# Patient Record
Sex: MALE | Race: WHITE | NOT HISPANIC OR LATINO | Employment: FULL TIME | URBAN - NONMETROPOLITAN AREA
[De-identification: names, ages, dates, MRNs, and addresses within clinical notes are randomized per-mention and may not be internally consistent; named-entity substitution may affect disease eponyms.]

---

## 2024-03-06 PROBLEM — Z79.01 ANTICOAGULATED: Status: ACTIVE | Noted: 2024-03-06

## 2024-03-06 PROBLEM — E78.2 MIXED HYPERLIPIDEMIA: Status: ACTIVE | Noted: 2024-03-06

## 2024-03-06 PROBLEM — Z86.16 PERSONAL HISTORY OF COVID-19: Status: ACTIVE | Noted: 2024-03-06

## 2024-03-06 PROBLEM — I27.82 CHRONIC PULMONARY EMBOLISM WITHOUT ACUTE COR PULMONALE (MULTI): Status: ACTIVE | Noted: 2024-03-06

## 2024-03-06 PROBLEM — R06.02 SOB (SHORTNESS OF BREATH) ON EXERTION: Status: ACTIVE | Noted: 2024-03-06

## 2024-03-06 PROBLEM — E11.9 DIABETES (MULTI): Status: ACTIVE | Noted: 2024-03-06

## 2024-03-06 PROBLEM — I10 HYPERTENSION, ESSENTIAL, BENIGN: Status: ACTIVE | Noted: 2024-03-06

## 2024-03-06 PROBLEM — R93.1 ECHOCARDIOGRAM ABNORMAL: Status: ACTIVE | Noted: 2024-03-06

## 2024-03-06 PROBLEM — R29.898 WEAKNESS OF LEFT ARM: Status: ACTIVE | Noted: 2024-03-06

## 2024-03-06 PROBLEM — I48.0 PAROXYSMAL ATRIAL FIBRILLATION (MULTI): Status: ACTIVE | Noted: 2024-03-06

## 2024-03-06 RX ORDER — METOPROLOL TARTRATE 25 MG/1
0.5 TABLET, FILM COATED ORAL 2 TIMES DAILY
COMMUNITY

## 2024-03-06 RX ORDER — AMLODIPINE BESYLATE 5 MG/1
1 TABLET ORAL DAILY
COMMUNITY
Start: 2022-08-01

## 2024-03-06 RX ORDER — PAROXETINE HYDROCHLORIDE HEMIHYDRATE 25 MG/1
1 TABLET, FILM COATED, EXTENDED RELEASE ORAL DAILY
COMMUNITY

## 2024-03-06 RX ORDER — LOSARTAN POTASSIUM 50 MG/1
2 TABLET ORAL DAILY
COMMUNITY

## 2024-03-06 RX ORDER — ROSUVASTATIN CALCIUM 10 MG/1
1 TABLET, COATED ORAL DAILY
COMMUNITY

## 2024-03-06 RX ORDER — TRIAMTERENE/HYDROCHLOROTHIAZID 37.5-25 MG
1 TABLET ORAL DAILY
COMMUNITY

## 2024-03-06 RX ORDER — INSULIN LISPRO 100 [IU]/ML
INJECTION, SOLUTION INTRAVENOUS; SUBCUTANEOUS
COMMUNITY

## 2024-03-06 RX ORDER — METFORMIN HYDROCHLORIDE 500 MG/1
1 TABLET ORAL DAILY
COMMUNITY

## 2024-03-06 RX ORDER — INSULIN DETEMIR 100 [IU]/ML
INJECTION, SOLUTION SUBCUTANEOUS
COMMUNITY

## 2024-07-10 ENCOUNTER — APPOINTMENT (OUTPATIENT)
Dept: CARDIOLOGY | Facility: CLINIC | Age: 59
End: 2024-07-10

## 2024-09-17 ENCOUNTER — APPOINTMENT (OUTPATIENT)
Dept: CARDIOLOGY | Facility: CLINIC | Age: 59
End: 2024-09-17
Payer: COMMERCIAL

## 2024-09-25 ENCOUNTER — APPOINTMENT (OUTPATIENT)
Dept: CARDIOLOGY | Facility: CLINIC | Age: 59
End: 2024-09-25
Payer: MEDICARE

## 2024-09-25 VITALS
OXYGEN SATURATION: 97 % | SYSTOLIC BLOOD PRESSURE: 118 MMHG | HEART RATE: 74 BPM | WEIGHT: 315 LBS | DIASTOLIC BLOOD PRESSURE: 66 MMHG | HEIGHT: 68 IN | BODY MASS INDEX: 47.74 KG/M2

## 2024-09-25 DIAGNOSIS — R06.02 SHORTNESS OF BREATH: ICD-10-CM

## 2024-09-25 DIAGNOSIS — E11.9 TYPE 2 DIABETES MELLITUS WITHOUT COMPLICATION, WITH LONG-TERM CURRENT USE OF INSULIN (MULTI): ICD-10-CM

## 2024-09-25 DIAGNOSIS — I10 HYPERTENSION, ESSENTIAL, BENIGN: ICD-10-CM

## 2024-09-25 DIAGNOSIS — R06.02 SOB (SHORTNESS OF BREATH) ON EXERTION: ICD-10-CM

## 2024-09-25 DIAGNOSIS — Z79.01 ANTICOAGULATED: ICD-10-CM

## 2024-09-25 DIAGNOSIS — I48.0 PAROXYSMAL ATRIAL FIBRILLATION (MULTI): ICD-10-CM

## 2024-09-25 DIAGNOSIS — Z79.4 TYPE 2 DIABETES MELLITUS WITHOUT COMPLICATION, WITH LONG-TERM CURRENT USE OF INSULIN (MULTI): ICD-10-CM

## 2024-09-25 DIAGNOSIS — E78.2 MIXED HYPERLIPIDEMIA: ICD-10-CM

## 2024-09-25 DIAGNOSIS — I27.82 CHRONIC PULMONARY EMBOLISM WITHOUT ACUTE COR PULMONALE, UNSPECIFIED PULMONARY EMBOLISM TYPE (MULTI): ICD-10-CM

## 2024-09-25 DIAGNOSIS — I82.4Y9 DEEP VEIN THROMBOSIS (DVT) OF PROXIMAL LOWER EXTREMITY, UNSPECIFIED CHRONICITY, UNSPECIFIED LATERALITY (MULTI): ICD-10-CM

## 2024-09-25 DIAGNOSIS — F17.200 CURRENT SMOKER: ICD-10-CM

## 2024-09-25 PROBLEM — Z87.891 FORMER SMOKER: Status: ACTIVE | Noted: 2024-09-25

## 2024-09-25 PROBLEM — I82.409 DVT (DEEP VENOUS THROMBOSIS) (MULTI): Status: ACTIVE | Noted: 2024-09-25

## 2024-09-25 PROCEDURE — 4010F ACE/ARB THERAPY RXD/TAKEN: CPT | Performed by: INTERNAL MEDICINE

## 2024-09-25 PROCEDURE — 99214 OFFICE O/P EST MOD 30 MIN: CPT | Performed by: INTERNAL MEDICINE

## 2024-09-25 PROCEDURE — 3078F DIAST BP <80 MM HG: CPT | Performed by: INTERNAL MEDICINE

## 2024-09-25 PROCEDURE — 4004F PT TOBACCO SCREEN RCVD TLK: CPT | Performed by: INTERNAL MEDICINE

## 2024-09-25 PROCEDURE — 3008F BODY MASS INDEX DOCD: CPT | Performed by: INTERNAL MEDICINE

## 2024-09-25 PROCEDURE — 3074F SYST BP LT 130 MM HG: CPT | Performed by: INTERNAL MEDICINE

## 2024-09-25 RX ORDER — SEMAGLUTIDE 1.34 MG/ML
1 INJECTION, SOLUTION SUBCUTANEOUS WEEKLY
COMMUNITY

## 2024-09-25 RX ORDER — AMLODIPINE BESYLATE 2.5 MG/1
2.5 TABLET ORAL DAILY
COMMUNITY
Start: 2024-09-09

## 2024-09-25 RX ORDER — INSULIN DEGLUDEC 200 U/ML
INJECTION, SOLUTION SUBCUTANEOUS DAILY
COMMUNITY
Start: 2024-01-18

## 2024-09-25 RX ORDER — FUROSEMIDE 40 MG/1
40 TABLET ORAL AS NEEDED
COMMUNITY
Start: 2024-07-11

## 2024-09-25 RX ORDER — FLUTICASONE FUROATE, UMECLIDINIUM BROMIDE AND VILANTEROL TRIFENATATE 100; 62.5; 25 UG/1; UG/1; UG/1
1 POWDER RESPIRATORY (INHALATION) DAILY
COMMUNITY

## 2024-09-25 RX ORDER — LOSARTAN POTASSIUM 100 MG/1
100 TABLET ORAL DAILY
COMMUNITY
Start: 2024-07-29

## 2024-09-25 ASSESSMENT — ENCOUNTER SYMPTOMS: DYSPNEA ON EXERTION: 1

## 2024-09-25 NOTE — PROGRESS NOTES
"Subjective   Jose Aguirre is a 59 y.o. male       Chief Complaint    Annual Exam          59-year-old gentleman returns for annual follow-up he is doing well from a cardiopulmonary standpoint with no recurrent thromboembolic events.    He still has exertional dyspnea and shortness of breath improved with inhaler therapy that his primary care physician prescribed.  Additionally he has sleep apnea, he does not comply with CPAP at night because he sleeps in a recliner now.  His wife mentions that when he sleeps in bed and reclines completely his oxygen saturations dipped down into the 80s.  He does not have a pulmonologist.    He has a history of submassive pulmonary embolism treated with catheter-based thrombolytic therapy in January 2022 associated with acute COVID illness, with normalization of his right ventricle and normal left ventricular function. He has no history of coronary artery disease or acute coronary syndrome     He has underlying obesity and diabetes    He remains on lifelong anticoagulation.  He remains obese, diabetic, sleep apnea    Recommendations, proceed with pulmonary function testing, he likely should have a pulmonary consult for further assessment, follow-up in 1 year         Review of Systems   Cardiovascular:  Positive for dyspnea on exertion.   All other systems reviewed and are negative.           Vitals:    09/25/24 1224   BP: 118/66   BP Location: Left arm   Patient Position: Sitting   Pulse: 74   SpO2: 97%   Weight: (!) 155 kg (341 lb)   Height: 1.727 m (5' 8\")        Objective   Physical Exam  Constitutional:       Appearance: Normal appearance.   HENT:      Nose: Nose normal.   Neck:      Vascular: No carotid bruit.   Cardiovascular:      Rate and Rhythm: Normal rate.      Pulses: Normal pulses.      Heart sounds: Normal heart sounds.   Pulmonary:      Effort: Pulmonary effort is normal.   Abdominal:      General: Bowel sounds are normal.      Palpations: Abdomen is soft. "   Musculoskeletal:         General: Normal range of motion.      Cervical back: Normal range of motion.      Right lower leg: No edema.      Left lower leg: No edema.   Skin:     General: Skin is warm and dry.   Neurological:      General: No focal deficit present.      Mental Status: He is alert.   Psychiatric:         Mood and Affect: Mood normal.         Behavior: Behavior normal.         Thought Content: Thought content normal.         Judgment: Judgment normal.         Allergies  Heparin     Current Medications    Current Outpatient Medications:     amLODIPine (Norvasc) 2.5 mg tablet, 1 tablet (2.5 mg) once daily., Disp: , Rfl:     apixaban (Eliquis) 5 mg tablet, Take 1 tablet (5 mg) by mouth 2 times a day., Disp: , Rfl:     fluticasone-umeclidin-vilanter (Trelegy Ellipta) 100-62.5-25 mcg blister with device, Inhale 1 puff once daily., Disp: , Rfl:     furosemide (Lasix) 40 mg tablet, 1 tablet (40 mg) if needed., Disp: , Rfl:     insulin degludec (Tresiba FlexTouch) 200 unit/mL (3 mL) injection, Inject under the skin early in the morning.., Disp: , Rfl:     insulin lispro (HumaLOG U-100 Insulin) 100 unit/mL injection, Inject under the skin. As directed, Disp: , Rfl:     losartan (Cozaar) 100 mg tablet, 1 tablet (100 mg) once daily., Disp: , Rfl:     metFORMIN (Glucophage) 500 mg tablet, Take 1 tablet (500 mg) by mouth 2 times daily (morning and late afternoon)., Disp: , Rfl:     metoprolol tartrate (Lopressor) 25 mg tablet, Take 0.5 tablets (12.5 mg) by mouth 2 times a day., Disp: , Rfl:     oxygen (O2) gas therapy, Inhale 1 each continuously. 2 LPM at bedtime, Disp: , Rfl:     Ozempic 1 mg/dose (4 mg/3 mL) pen injector, Inject 1 mg under the skin 1 (one) time per week., Disp: , Rfl:     PARoxetine CR (Paxil-CR) 25 mg 24 hr tablet, Take 1 tablet (25 mg) by mouth once daily., Disp: , Rfl:     rosuvastatin (Crestor) 10 mg tablet, Take 1 tablet (10 mg) by mouth once daily., Disp: , Rfl:      triamterene-hydrochlorothiazid (Maxzide-25) 37.5-25 mg tablet, Take 1 tablet by mouth once daily., Disp: , Rfl:                      Assessment/Plan   1. Chronic pulmonary embolism without acute cor pulmonale, unspecified pulmonary embolism type (Multi)        2. Paroxysmal atrial fibrillation (Multi)        3. Hypertension, essential, benign        4. Mixed hyperlipidemia        5. SOB (shortness of breath) on exertion        6. Anticoagulated        7. Deep vein thrombosis (DVT) of proximal lower extremity, unspecified chronicity, unspecified laterality (Multi)        8. Type 2 diabetes mellitus without complication, with long-term current use of insulin (Multi)        9. BMI 50.0-59.9, adult (Multi)        10. Shortness of breath        11. Current smoker                 Scribe Attestation  By signing my name below, I, Aliza JONES RN   , Scribe   attest that this documentation has been prepared under the direction and in the presence of Memo Romero DO.     Provider Attestation - Scribe documentation    All medical record entries made by the Scribe were at my direction and personally dictated by me. I have reviewed the chart and agree that the record accurately reflects my personal performance of the history, physical exam, discussion and plan.

## 2024-09-25 NOTE — LETTER
September 25, 2024     Gigi Jeff DO  [address] [facility] [address]    Patient: Jose Aguirre   YOB: 1965   Date of Visit: 9/25/2024       Dear Dr. Gigi Jeff DO:    Thank you for referring Jose Aguirre to me for evaluation. Below are my notes for this consultation.  If you have questions, please do not hesitate to call me. I look forward to following your patient along with you.       Sincerely,     Memo Romero DO      CC: No Recipients  ______________________________________________________________________________________    Subjective   Jose Aguirre is a 59 y.o. male       Chief Complaint    Annual Exam          59-year-old gentleman returns for annual follow-up he is doing well from a cardiopulmonary standpoint with no recurrent thromboembolic events.    He still has exertional dyspnea and shortness of breath improved with inhaler therapy that his primary care physician prescribed.  Additionally he has sleep apnea, he does not comply with CPAP at night because he sleeps in a recliner now.  His wife mentions that when he sleeps in bed and reclines completely his oxygen saturations dipped down into the 80s.  He does not have a pulmonologist.    He has a history of submassive pulmonary embolism treated with catheter-based thrombolytic therapy in January 2022 associated with acute COVID illness, with normalization of his right ventricle and normal left ventricular function. He has no history of coronary artery disease or acute coronary syndrome     He has underlying obesity and diabetes    He remains on lifelong anticoagulation.  He remains obese, diabetic, sleep apnea    Recommendations, proceed with pulmonary function testing, he likely should have a pulmonary consult for further assessment, follow-up in 1 year         Review of Systems   Cardiovascular:  Positive for dyspnea on exertion.   All other systems reviewed and are negative.           Vitals:    09/25/24  "1224   BP: 118/66   BP Location: Left arm   Patient Position: Sitting   Pulse: 74   SpO2: 97%   Weight: (!) 155 kg (341 lb)   Height: 1.727 m (5' 8\")        Objective   Physical Exam  Constitutional:       Appearance: Normal appearance.   HENT:      Nose: Nose normal.   Neck:      Vascular: No carotid bruit.   Cardiovascular:      Rate and Rhythm: Normal rate.      Pulses: Normal pulses.      Heart sounds: Normal heart sounds.   Pulmonary:      Effort: Pulmonary effort is normal.   Abdominal:      General: Bowel sounds are normal.      Palpations: Abdomen is soft.   Musculoskeletal:         General: Normal range of motion.      Cervical back: Normal range of motion.      Right lower leg: No edema.      Left lower leg: No edema.   Skin:     General: Skin is warm and dry.   Neurological:      General: No focal deficit present.      Mental Status: He is alert.   Psychiatric:         Mood and Affect: Mood normal.         Behavior: Behavior normal.         Thought Content: Thought content normal.         Judgment: Judgment normal.         Allergies  Heparin     Current Medications    Current Outpatient Medications:   •  amLODIPine (Norvasc) 2.5 mg tablet, 1 tablet (2.5 mg) once daily., Disp: , Rfl:   •  apixaban (Eliquis) 5 mg tablet, Take 1 tablet (5 mg) by mouth 2 times a day., Disp: , Rfl:   •  fluticasone-umeclidin-vilanter (Trelegy Ellipta) 100-62.5-25 mcg blister with device, Inhale 1 puff once daily., Disp: , Rfl:   •  furosemide (Lasix) 40 mg tablet, 1 tablet (40 mg) if needed., Disp: , Rfl:   •  insulin degludec (Tresiba FlexTouch) 200 unit/mL (3 mL) injection, Inject under the skin early in the morning.., Disp: , Rfl:   •  insulin lispro (HumaLOG U-100 Insulin) 100 unit/mL injection, Inject under the skin. As directed, Disp: , Rfl:   •  losartan (Cozaar) 100 mg tablet, 1 tablet (100 mg) once daily., Disp: , Rfl:   •  metFORMIN (Glucophage) 500 mg tablet, Take 1 tablet (500 mg) by mouth 2 times daily (morning " and late afternoon)., Disp: , Rfl:   •  metoprolol tartrate (Lopressor) 25 mg tablet, Take 0.5 tablets (12.5 mg) by mouth 2 times a day., Disp: , Rfl:   •  oxygen (O2) gas therapy, Inhale 1 each continuously. 2 LPM at bedtime, Disp: , Rfl:   •  Ozempic 1 mg/dose (4 mg/3 mL) pen injector, Inject 1 mg under the skin 1 (one) time per week., Disp: , Rfl:   •  PARoxetine CR (Paxil-CR) 25 mg 24 hr tablet, Take 1 tablet (25 mg) by mouth once daily., Disp: , Rfl:   •  rosuvastatin (Crestor) 10 mg tablet, Take 1 tablet (10 mg) by mouth once daily., Disp: , Rfl:   •  triamterene-hydrochlorothiazid (Maxzide-25) 37.5-25 mg tablet, Take 1 tablet by mouth once daily., Disp: , Rfl:                      Assessment/Plan   1. Chronic pulmonary embolism without acute cor pulmonale, unspecified pulmonary embolism type (Multi)        2. Paroxysmal atrial fibrillation (Multi)        3. Hypertension, essential, benign        4. Mixed hyperlipidemia        5. SOB (shortness of breath) on exertion        6. Anticoagulated        7. Deep vein thrombosis (DVT) of proximal lower extremity, unspecified chronicity, unspecified laterality (Multi)        8. Type 2 diabetes mellitus without complication, with long-term current use of insulin (Multi)        9. BMI 50.0-59.9, adult (Multi)        10. Shortness of breath        11. Current smoker                 Scribe Attestation  By signing my name below, I, Aliza JONES RN   , Scribe   attest that this documentation has been prepared under the direction and in the presence of Memo Romero DO.     Provider Attestation - Scribe documentation    All medical record entries made by the Scribe were at my direction and personally dictated by me. I have reviewed the chart and agree that the record accurately reflects my personal performance of the history, physical exam, discussion and plan.

## 2024-09-25 NOTE — PATIENT INSTRUCTIONS
Please bring all medicines, vitamins, and herbal supplements with you when you come to the office.    Prescriptions will not be filled unless you are compliant with your follow up appointments or have a follow up appointment scheduled as per instruction of your physician. Refills should be requested at the time of your visit.     BMI was above normal measurement. Current weight: (!) 155 kg (341 lb)  Weight change since last visit (-) denotes wt loss 19 lbs   Weight loss needed to achieve BMI 25: 176.9 Lbs  Weight loss needed to achieve BMI 30: 144.1 Lbs  Provided instructions on dietary changes  Provided instructions on exercise.

## 2024-11-05 ENCOUNTER — TELEPHONE (OUTPATIENT)
Dept: CARDIOLOGY | Facility: CLINIC | Age: 59
End: 2024-11-05
Payer: MEDICARE

## 2025-09-30 ENCOUNTER — APPOINTMENT (OUTPATIENT)
Dept: CARDIOLOGY | Facility: CLINIC | Age: 60
End: 2025-09-30
Payer: MEDICARE